# Patient Record
Sex: MALE | Race: WHITE | NOT HISPANIC OR LATINO
[De-identification: names, ages, dates, MRNs, and addresses within clinical notes are randomized per-mention and may not be internally consistent; named-entity substitution may affect disease eponyms.]

---

## 2022-06-13 PROBLEM — Z00.00 ENCOUNTER FOR PREVENTIVE HEALTH EXAMINATION: Status: ACTIVE | Noted: 2022-06-13

## 2022-06-15 ENCOUNTER — APPOINTMENT (OUTPATIENT)
Dept: UROLOGY | Facility: CLINIC | Age: 37
End: 2022-06-15
Payer: COMMERCIAL

## 2022-06-15 VITALS
WEIGHT: 245 LBS | SYSTOLIC BLOOD PRESSURE: 155 MMHG | BODY MASS INDEX: 37.13 KG/M2 | DIASTOLIC BLOOD PRESSURE: 82 MMHG | HEIGHT: 68 IN | HEART RATE: 102 BPM | TEMPERATURE: 97.7 F | OXYGEN SATURATION: 95 %

## 2022-06-15 DIAGNOSIS — R68.82 DECREASED LIBIDO: ICD-10-CM

## 2022-06-15 DIAGNOSIS — R35.1 NOCTURIA: ICD-10-CM

## 2022-06-15 DIAGNOSIS — R39.9 UNSPECIFIED SYMPTOMS AND SIGNS INVOLVING THE GENITOURINARY SYSTEM: ICD-10-CM

## 2022-06-15 DIAGNOSIS — E29.1 TESTICULAR HYPOFUNCTION: ICD-10-CM

## 2022-06-15 DIAGNOSIS — R39.13 SPLITTING OF URINARY STREAM: ICD-10-CM

## 2022-06-15 DIAGNOSIS — Z87.09 PERSONAL HISTORY OF OTHER DISEASES OF THE RESPIRATORY SYSTEM: ICD-10-CM

## 2022-06-15 DIAGNOSIS — R10.9 UNSPECIFIED ABDOMINAL PAIN: ICD-10-CM

## 2022-06-15 PROCEDURE — 51798 US URINE CAPACITY MEASURE: CPT

## 2022-06-15 PROCEDURE — 99204 OFFICE O/P NEW MOD 45 MIN: CPT | Mod: 25

## 2022-06-15 NOTE — HISTORY OF PRESENT ILLNESS
[FreeTextEntry1] : DESTINI NAJERA is a 37 year old M who presents with nocturia which is really bothersome. He works midnights and is having lethargy due to his interrupted sleep, and c/o mild ED.\par Was worried about prostate.  Takes thousands of mg of caffeine daily.\par \par Has h/o hypogonadism and is on T replacement; self injects 1 ml weekly; takes a lot of Supplements for working out as well. Dr. Hallman manages his hormones. Pt is concerned by his low libido as well, though admits he is not w a partner right now.\par \par Right now pt is single. Does not get full rigidity,  but when with someone gets full rigidity and can penetrate, though it doesn't last long.\par \par M passed at 56 yo from thyroid cancer\par D alive at 64 yo\par \par Reports hesitancy and occasionally intermittency. feels he is straining to urinate.\par Occ constipation and now has hemorrhoid. \par \par Denies STD's or UTI's and no known stones.\par \par Takes many supplements and protein drinks etc.\par

## 2022-06-15 NOTE — ASSESSMENT
[FreeTextEntry1] : Rt flank pain: will order ARIAS to r/o stones as he has a high protein diet\par Ck other hormones: hormone panel drawn\par Explained the urination. If still with intermittency, suggested at next visit, he come in with full bladder and FLOW. Will recheck PVR.\par \par Not interested in ED meds at this time. \par \par Suggested he decrease his energy drinks and avoid all of these protein supplements, as it sounds like his caffeine intake is contributing to his nocturia, though he might consider a sleep study to r/o DANIEL as that is one of most common reasons for nocturia.\par \par Spent 52 min w pt

## 2022-06-15 NOTE — PHYSICAL EXAM
Detail Level: Simple Include Location In Plan?: No [General Appearance - Well Developed] : well developed [General Appearance - Well Nourished] : well nourished [Normal Appearance] : normal appearance [Well Groomed] : well groomed [General Appearance - In No Acute Distress] : no acute distress [Edema] : no peripheral edema [Respiration, Rhythm And Depth] : normal respiratory rhythm and effort [Exaggerated Use Of Accessory Muscles For Inspiration] : no accessory muscle use [Abdomen Soft] : soft [Abdomen Tenderness] : non-tender [Urethral Meatus] : meatus normal [Penis Abnormality] : normal circumcised penis [Urinary Bladder Findings] : the bladder was normal on palpation [Testes Tenderness] : no tenderness of the testes [Scrotum] : the scrotum was normal [Testes Mass (___cm)] : there were no testicular masses [No Prostate Nodules] : no prostate nodules [Prostate Size ___ gm] : prostate size [unfilled] gm [Normal Station and Gait] : the gait and station were normal for the patient's age [] : no rash [No Focal Deficits] : no focal deficits [Oriented To Time, Place, And Person] : oriented to person, place, and time [Affect] : the affect was normal [Mood] : the mood was normal [Not Anxious] : not anxious [No Palpable Adenopathy] : no palpable adenopathy [FreeTextEntry1] : rt flank pain

## 2022-06-15 NOTE — REVIEW OF SYSTEMS
[Eyesight Problems] : eyesight problems [Strong urge to urinate] : strong urge to urinate [Strain or push to urinate] : strain or push to urinate [Slow urine stream] : slow urine stream [Bladder fullness after urinating] : bladder fullness after urinating [Negative] : Heme/Lymph

## 2022-06-16 LAB
APPEARANCE: CLEAR
BACTERIA: NEGATIVE
BILIRUBIN URINE: NEGATIVE
BLOOD URINE: NORMAL
CALCIUM OXALATE CRYSTALS: ABNORMAL
COLOR: NORMAL
GLUCOSE QUALITATIVE U: NEGATIVE
HYALINE CASTS: 0 /LPF
KETONES URINE: NEGATIVE
LEUKOCYTE ESTERASE URINE: NEGATIVE
MICROSCOPIC-UA: NORMAL
NITRITE URINE: NEGATIVE
PH URINE: 6.5
PROTEIN URINE: NORMAL
RED BLOOD CELLS URINE: 1 /HPF
SPECIFIC GRAVITY URINE: 1.03
SQUAMOUS EPITHELIAL CELLS: 0 /HPF
UROBILINOGEN URINE: NORMAL
WHITE BLOOD CELLS URINE: 1 /HPF

## 2022-06-20 LAB
BACTERIA UR CULT: NORMAL
BASOPHILS # BLD AUTO: 0.03 K/UL
BASOPHILS NFR BLD AUTO: 0.4 %
CHOLEST SERPL-MCNC: 116 MG/DL
EOSINOPHIL # BLD AUTO: 0.08 K/UL
EOSINOPHIL NFR BLD AUTO: 1.1 %
ESTRADIOL SERPL-MCNC: <5 PG/ML
FSH SERPL-MCNC: 0.4 IU/L
HCT VFR BLD CALC: 49.6 %
HDLC SERPL-MCNC: 24 MG/DL
HGB BLD-MCNC: 17.1 G/DL
IMM GRANULOCYTES NFR BLD AUTO: 0.3 %
LDLC SERPL CALC-MCNC: 13 MG/DL
LH SERPL-ACNC: <0.3 IU/L
LYMPHOCYTES # BLD AUTO: 1.15 K/UL
LYMPHOCYTES NFR BLD AUTO: 15.5 %
MAN DIFF?: NORMAL
MCHC RBC-ENTMCNC: 33.6 PG
MCHC RBC-ENTMCNC: 34.5 GM/DL
MCV RBC AUTO: 97.4 FL
MONOCYTES # BLD AUTO: 1.09 K/UL
MONOCYTES NFR BLD AUTO: 14.7 %
NEUTROPHILS # BLD AUTO: 5.04 K/UL
NEUTROPHILS NFR BLD AUTO: 68 %
NONHDLC SERPL-MCNC: 92 MG/DL
PLATELET # BLD AUTO: 167 K/UL
PROLACTIN SERPL-MCNC: 7.2 NG/ML
PSA SERPL-MCNC: 0.48 NG/ML
RBC # BLD: 5.09 M/UL
RBC # FLD: 13 %
TESTOST SERPL-MCNC: 377 NG/DL
TRIGL SERPL-MCNC: 394 MG/DL
TSH SERPL-ACNC: 1.04 UIU/ML
WBC # FLD AUTO: 7.41 K/UL

## 2022-06-29 ENCOUNTER — APPOINTMENT (OUTPATIENT)
Dept: UROLOGY | Facility: CLINIC | Age: 37
End: 2022-06-29

## 2022-10-10 ENCOUNTER — APPOINTMENT (OUTPATIENT)
Dept: UROLOGY | Facility: CLINIC | Age: 37
End: 2022-10-10